# Patient Record
Sex: MALE | URBAN - METROPOLITAN AREA
[De-identification: names, ages, dates, MRNs, and addresses within clinical notes are randomized per-mention and may not be internally consistent; named-entity substitution may affect disease eponyms.]

---

## 2024-08-26 ENCOUNTER — TELEPHONE (OUTPATIENT)
Age: 39
End: 2024-08-26

## 2024-08-26 DIAGNOSIS — M25.532 PAIN IN LEFT WRIST: Primary | ICD-10-CM

## 2024-08-26 NOTE — TELEPHONE ENCOUNTER
Hello,    Please advise if a forced appointment can be accommodated for the patient:    Call back #: [phone]    Insurance: [employer]    Reason for appointment: left radius fx-scheduled for 8/29 now but please schedule for 8/27    Requested doctor and/or location: [facility]      Thank you.

## 2024-08-29 ENCOUNTER — APPOINTMENT (OUTPATIENT)
Dept: RADIOLOGY | Facility: CLINIC | Age: 39
End: 2024-08-29

## 2024-08-29 VITALS
SYSTOLIC BLOOD PRESSURE: 125 MMHG | OXYGEN SATURATION: 98 % | WEIGHT: 233 LBS | DIASTOLIC BLOOD PRESSURE: 85 MMHG | HEART RATE: 65 BPM | HEIGHT: 72 IN | BODY MASS INDEX: 31.56 KG/M2

## 2024-08-29 DIAGNOSIS — M25.532 PAIN IN LEFT WRIST: ICD-10-CM

## 2024-08-29 DIAGNOSIS — S52.515A CLOSED NONDISPLACED FRACTURE OF STYLOID PROCESS OF LEFT RADIUS, INITIAL ENCOUNTER: Primary | ICD-10-CM

## 2024-08-29 PROCEDURE — 99203 OFFICE O/P NEW LOW 30 MIN: CPT | Performed by: ORTHOPAEDIC SURGERY

## 2024-08-29 PROCEDURE — 29075 APPL CST ELBW FNGR SHORT ARM: CPT

## 2024-08-29 PROCEDURE — 73110 X-RAY EXAM OF WRIST: CPT

## 2024-08-29 NOTE — PROGRESS NOTES
Orthopaedics Office Visit - New Patient Visit    ASSESSMENT/PLAN:    Assessment:   Nondisplaced intra-articular radial styloid fracture, DOI 8/24/2024    Plan:   XR obtained and reviewed in office today  Discussed diagnosis  Can proceed with non operative treatment via short arm cast  Cast care instructions provided  OTC NSAIDs PRN  Follow-up in 1 week    To Do Next Visit:  XR left wrist with cast on    _____________________________________________________  CHIEF COMPLAINT:  Chief Complaint   Patient presents with   • Left Wrist - Pain         SUBJECTIVE:  Estevan Alvarado is a 39 y.o. RHD male who presents for initial evaluation of the left wrist. He was playing softball when he dove for the ball and felt a crack in the left wrist as his glove got caught on the ground. He was seen in the local ED where X-rays of the left wrist which demonstrated a nondisplaced intra-articular fracture of the left distal radius. Today, he states his pain is well controlled. He feels the swelling has decreased since the time of injury. He denies any distal paresthesias of the LUE. He has taken Advil scarcely for pain control.    PAST MEDICAL HISTORY:  History reviewed. No pertinent past medical history.    PAST SURGICAL HISTORY:  Past Surgical History:   Procedure Laterality Date   • EYE SURGERY Left     As an infant       FAMILY HISTORY:  Family History   Problem Relation Age of Onset   • Hypertension Brother        SOCIAL HISTORY:  Social History     Tobacco Use   • Smoking status: Never   • Smokeless tobacco: Never   Vaping Use   • Vaping status: Never Used   Substance Use Topics   • Alcohol use: Yes     Alcohol/week: 3.0 standard drinks of alcohol     Types: 3 Standard drinks or equivalent per week   • Drug use: Never       MEDICATIONS:  No current outpatient medications on file.    ALLERGIES:  No Known Allergies    REVIEW OF SYSTEMS:  MSK: see physical exam  Neuro: WNL  Pertinent items are otherwise noted in HPI.  A comprehensive  "review of systems was otherwise negative.    LABS:  HgA1c: No results found for: \"HGBA1C\"  BMP: No results found for: \"GLUCOSE\", \"CALCIUM\", \"NA\", \"K\", \"CO2\", \"CL\", \"BUN\", \"CREATININE\"  CBC: No components found for: \"CBC\"    _____________________________________________________  PHYSICAL EXAMINATION:  Vital signs: /85   Pulse 65   Ht 6' (1.829 m)   Wt 106 kg (233 lb)   SpO2 98%   BMI 31.60 kg/m²   General: No acute distress, awake and alert  Psychiatric: Mood and affect appear appropriate  HEENT: Trachea Midline, No torticollis, no apparent facial trauma  Cardiovascular: No audible murmurs; Extremities appear perfused  Pulmonary: No audible wheezing or stridor  Skin: No open lesions; see further details (if any) below    MUSCULOSKELETAL EXAMINATION:  Extremities:  Left Wrist  Mild radial sided swelling  Mild ecchymosis of left forearm  ROM and strength deferred due to known fracture  SILT  NV distally intact  _____________________________________________________  STUDIES REVIEWED:  I personally reviewed the images obtained in office today and my independent interpretation is as follows:  XR left wrist: nondisplaced intra-articular radial styloid fracture    PROCEDURES PERFORMED:  Cast application    Date/Time: 8/29/2024 2:15 PM    Performed by: Naldo Fried MA  Authorized by: Justyn Barroso MD  Universal Protocol:  Consent: Verbal consent obtained.  Risks and benefits: risks, benefits and alternatives were discussed  Consent given by: patient  Time out: Immediately prior to procedure a \"time out\" was called to verify the correct patient, procedure, equipment, support staff and site/side marked as required.  Patient understanding: patient states understanding of the procedure being performed  Site marked: the operative site was marked  Patient identity confirmed: verbally with patient    Pre-procedure details:     Sensation:  Normal  Procedure details:     Laterality:  Left    Location:  Wrist    " Wrist:  L wristCast type:  Short arm        Supplies:  Cotton padding, 2 layer wrap and fiberglass  Post-procedure details:     Pain:  Unchanged    Sensation:  Normal    Patient tolerance of procedure:  Tolerated well, no immediate complications      Scribe Attestation    I,:  Bridget Bangura am acting as a scribe while in the presence of the attending physician.:       I,:  Justyn Barroso MD personally performed the services described in this documentation    as scribed in my presence.:

## 2024-08-30 DIAGNOSIS — S52.515A CLOSED NONDISPLACED FRACTURE OF STYLOID PROCESS OF LEFT RADIUS, INITIAL ENCOUNTER: Primary | ICD-10-CM

## 2024-09-05 ENCOUNTER — APPOINTMENT (OUTPATIENT)
Dept: RADIOLOGY | Facility: CLINIC | Age: 39
End: 2024-09-05
Payer: COMMERCIAL

## 2024-09-05 VITALS
WEIGHT: 233 LBS | HEART RATE: 78 BPM | SYSTOLIC BLOOD PRESSURE: 123 MMHG | HEIGHT: 72 IN | DIASTOLIC BLOOD PRESSURE: 74 MMHG | BODY MASS INDEX: 31.56 KG/M2

## 2024-09-05 DIAGNOSIS — S52.515A CLOSED NONDISPLACED FRACTURE OF STYLOID PROCESS OF LEFT RADIUS, INITIAL ENCOUNTER: Primary | ICD-10-CM

## 2024-09-05 DIAGNOSIS — S52.515A CLOSED NONDISPLACED FRACTURE OF STYLOID PROCESS OF LEFT RADIUS, INITIAL ENCOUNTER: ICD-10-CM

## 2024-09-05 PROCEDURE — 99213 OFFICE O/P EST LOW 20 MIN: CPT | Performed by: ORTHOPAEDIC SURGERY

## 2024-09-05 PROCEDURE — 73110 X-RAY EXAM OF WRIST: CPT

## 2024-09-05 NOTE — PATIENT INSTRUCTIONS
Non-weight bearing left upper extremity  Please keep cast dry at all times. Contact office if cast becomes wet or damaged  Over the counter analgesics as needed / directed   Ice / heat as directed   Follow up 3 weeks with repeat XR,  cast off

## 2024-09-05 NOTE — PROGRESS NOTES
Orthopaedics Office Visit - Follow up Patient Visit    ASSESSMENT/PLAN:    Assessment:   Nondisplaced intra-articular radial styloid fracture, DOI 8/24/2024   Treated non-operatively in short arm cast.    Maintained alignment in cast       Plan:   Non-weight bearing left upper extremity  Please keep cast dry at all times. Contact office if cast becomes wet or damaged  Over the counter analgesics as needed / directed   Ice / heat as directed   Follow up 3 weeks with repeat XR,  cast off       To Do Next Visit:  Cast off,  XR left wrist     _____________________________________________________  CHIEF COMPLAINT:  Chief Complaint   Patient presents with    Left Wrist - Follow-up         SUBJECTIVE:  Estevan Alvarado is a 39 y.o. male who presents Nondisplaced intra-articular radial styloid fracture, DOI 8/24/2024.  Patient states that his arm is doing well overall.  Patient states he has minimal pain in the arm currently.  Patient has been maintaining his short arm cast as previously directed no complaints.  Patient denies any numbness or tingling the arm.  Patient offers no other complaints at this time.      PAST MEDICAL HISTORY:  History reviewed. No pertinent past medical history.    PAST SURGICAL HISTORY:  Past Surgical History:   Procedure Laterality Date    EYE SURGERY Left     As an infant       FAMILY HISTORY:  Family History   Problem Relation Age of Onset    Hypertension Brother        SOCIAL HISTORY:  Social History     Tobacco Use    Smoking status: Never    Smokeless tobacco: Never   Vaping Use    Vaping status: Never Used   Substance Use Topics    Alcohol use: Yes     Alcohol/week: 3.0 standard drinks of alcohol     Types: 3 Standard drinks or equivalent per week    Drug use: Never       MEDICATIONS:  No current outpatient medications on file.    ALLERGIES:  No Known Allergies    REVIEW OF SYSTEMS:  MSK: left arm pain   Neuro: WNL   Pertinent items are otherwise noted in HPI.  A comprehensive review of  "systems was otherwise negative.    LABS:  HgA1c: No results found for: \"HGBA1C\"  BMP: No results found for: \"GLUCOSE\", \"CALCIUM\", \"NA\", \"K\", \"CO2\", \"CL\", \"BUN\", \"CREATININE\"  CBC: No components found for: \"CBC\"    _____________________________________________________  PHYSICAL EXAMINATION:  Vital signs: /74   Pulse 78   Ht 6' (1.829 m)   Wt 106 kg (233 lb)   BMI 31.60 kg/m²   General: No acute distress, awake and alert  Psychiatric: Mood and affect appear appropriate  HEENT: Trachea Midline, No torticollis, no apparent facial trauma  Cardiovascular: No audible murmurs; Extremities appear perfused  Pulmonary: No audible wheezing or stridor  Skin: No open lesions; see further details (if any) below      MUSCULOSKELETAL EXAMINATION:  Left wrist examination:  Patient sitting comfortably in the office in no apparent distress   Short arm cast present with no acute visible abnormalities   NV intact    _____________________________________________________  STUDIES REVIEWED:  I personally reviewed the images obtained in office today and my independent interpretation is as follows:  Left wrist XR 3 views :  Nondisplaced radial styloid fracture with maintained alignment comparison to previous examination        PROCEDURES PERFORMED:  Procedures            Howard Richter PA-C - assisting  Justyn Barroso MD                              Portions of the record may have been created with voice recognition software.  Occasional wrong word or \"sound a like\" substitutions may have occurred due to the inherent limitations of voice recognition software.  Read the chart carefully and recognize, using context, where substitutions have occurred.    "

## 2024-09-22 DIAGNOSIS — S52.515A CLOSED NONDISPLACED FRACTURE OF STYLOID PROCESS OF LEFT RADIUS, INITIAL ENCOUNTER: Primary | ICD-10-CM

## 2024-09-26 ENCOUNTER — APPOINTMENT (OUTPATIENT)
Dept: RADIOLOGY | Facility: CLINIC | Age: 39
End: 2024-09-26
Payer: COMMERCIAL

## 2024-09-26 VITALS
HEART RATE: 65 BPM | DIASTOLIC BLOOD PRESSURE: 78 MMHG | SYSTOLIC BLOOD PRESSURE: 124 MMHG | WEIGHT: 233 LBS | BODY MASS INDEX: 31.56 KG/M2 | HEIGHT: 72 IN

## 2024-09-26 DIAGNOSIS — S52.515A CLOSED NONDISPLACED FRACTURE OF STYLOID PROCESS OF LEFT RADIUS, INITIAL ENCOUNTER: ICD-10-CM

## 2024-09-26 DIAGNOSIS — S52.515A CLOSED NONDISPLACED FRACTURE OF STYLOID PROCESS OF LEFT RADIUS, INITIAL ENCOUNTER: Primary | ICD-10-CM

## 2024-09-26 PROCEDURE — 73110 X-RAY EXAM OF WRIST: CPT

## 2024-09-26 PROCEDURE — 99213 OFFICE O/P EST LOW 20 MIN: CPT | Performed by: ORTHOPAEDIC SURGERY

## 2024-09-26 NOTE — PROGRESS NOTES
"Orthopaedics Office Visit - Follow up Patient Visit    ASSESSMENT/PLAN:    Assessment:   Nondisplaced intra-articular radial styloid fracture,  DOI 8/24/2024   Treated non-operatively in short arm cast.    Maintained alignment in cast - healed clinically, near completely healed radiographically      Plan:   Non-weight bearing left upper extremity in cock up wrist brace for 1-2 additional weeks,  transition to weight bearing as tolerated   Maintain cock up wrist brace as directed  May remove for shower and sleep / physical therapy   Over the counter analgesics as needed / directed   Ice / heat as directed   Follow up 6 weeks       To Do Next Visit:  XR left wrist     _____________________________________________________  CHIEF COMPLAINT:  Chief Complaint   Patient presents with    Left Wrist - Follow-up         SUBJECTIVE:  Estevan Alvarado is a 39 y.o. male who presents to the office for follow-up evaluation of his left wrist.  Patient states that he has been maintaining his short arm cast as previously directed no complaints.  Patient offers no other complaints at this time.      PAST MEDICAL HISTORY:  History reviewed. No pertinent past medical history.    PAST SURGICAL HISTORY:  Past Surgical History:   Procedure Laterality Date    EYE SURGERY Left     As an infant       FAMILY HISTORY:  Family History   Problem Relation Age of Onset    Hypertension Brother        SOCIAL HISTORY:  Social History     Tobacco Use    Smoking status: Never    Smokeless tobacco: Never   Vaping Use    Vaping status: Never Used   Substance Use Topics    Alcohol use: Yes     Alcohol/week: 3.0 standard drinks of alcohol     Types: 3 Standard drinks or equivalent per week    Drug use: Never       MEDICATIONS:  No current outpatient medications on file.    ALLERGIES:  No Known Allergies    LABS:  HgA1c: No results found for: \"HGBA1C\"  BMP: No results found for: \"GLUCOSE\", \"CALCIUM\", \"NA\", \"K\", \"CO2\", \"CL\", \"BUN\", \"CREATININE\"  CBC: No " "components found for: \"CBC\"    _____________________________________________________  PHYSICAL EXAMINATION:  Vital signs: Ht 6' (1.829 m)   Wt 106 kg (233 lb)   BMI 31.60 kg/m²   General: No acute distress, awake and alert  Psychiatric: Mood and affect appear appropriate  HEENT: Trachea Midline, No torticollis, no apparent facial trauma  Cardiovascular: No audible murmurs; Extremities appear perfused  Pulmonary: No audible wheezing or stridor  Skin: No open lesions; see further details (if any) below      MUSCULOSKELETAL EXAMINATION:  Left wrist examination :  Patient sitting comfortably in the office in no apparent distress   No bony or soft tissue tenderness to palpation noted at this time  Patient extends and flexes wrist to 45 degrees limited by pain  NV intact    _____________________________________________________  STUDIES REVIEWED:  I personally reviewed the images obtained in office today and my independent interpretation is as follows:  Left wrist XR 3 views :  Healing radial styloid fracture with maintained alignment in comparison to previous radiographs.        PROCEDURES PERFORMED:  No procedures were performed at this time.                     Howard Richter PA-C - assisting  Justyn Barroso MD                              Portions of the record may have been created with voice recognition software.  Occasional wrong word or \"sound a like\" substitutions may have occurred due to the inherent limitations of voice recognition software.  Read the chart carefully and recognize, using context, where substitutions have occurred.    "

## 2024-09-26 NOTE — PATIENT INSTRUCTIONS
Non-weight bearing left upper extremity in cock up wrist brace for 1-2 additional weeks,  transition to weight bearing as tolerated   Maintain cock up wrist brace as directed  May remove for shower and sleep / physical therapy   Over the counter analgesics as needed / directed   Ice / heat as directed   Follow up 6 weeks

## 2024-10-09 ENCOUNTER — EVALUATION (OUTPATIENT)
Dept: OCCUPATIONAL THERAPY | Facility: CLINIC | Age: 39
End: 2024-10-09
Payer: COMMERCIAL

## 2024-10-09 DIAGNOSIS — S52.515A CLOSED NONDISPLACED FRACTURE OF STYLOID PROCESS OF LEFT RADIUS, INITIAL ENCOUNTER: ICD-10-CM

## 2024-10-09 PROCEDURE — 97165 OT EVAL LOW COMPLEX 30 MIN: CPT

## 2024-10-09 PROCEDURE — 97110 THERAPEUTIC EXERCISES: CPT

## 2024-10-09 NOTE — PROGRESS NOTES
OT Evaluation     Today's date: 10/9/2024  Patient name: Estevan Alvarado  : 1985  MRN: [id number]  Referring provider: Howard Richter PA*  Dx:   Encounter Diagnosis     ICD-10-CM    1. Closed nondisplaced fracture of styloid process of left radius, initial encounter  S52.515A Ambulatory Referral to PT/OT Hand Therapy                     Assessment  Impairments: abnormal or restricted ROM, activity intolerance, impaired physical strength, lacks appropriate home exercise program, pain with function and weight-bearing intolerance  Other impairment: edema  Functional limitations: No heavy lifting or resistive grasping at this time  Symptom irritability: low    Assessment details: Estevan Alvarado is a 39 y.o., Right HD male referred to hand therapy for a left radial styloid fracture.  Onset of injury 25 due to fall while playing softball.  Patient presents 10/09/24 with impaired ROM, strength of the left forearm, wrist, hand.  Deficits also noted in pain, edema and functional use of the left UE. Patient is wearing a wrist brace prn for activities and driving only. Patient is a good candidate for OT services to abolish pain and edema and restore ROM, strength for a return to independence in daily tasks.    Understanding of Dx/Px/POC: excellent     Prognosis: good    Goals  STGs/LTGs (4-6 weeks)  Patient will demonstrate independence in a HEP to maintain ROM, strength, and function at discharge  Patient will report an average pain level of 1-2/10 to be independent in daily tasks  Patient will demonstrate ROM of the left thumb to WNL  be independent in self care tasks  Patient will demonstrate AROM of the wrist and forearm WFL to be independent in self care tasks  Patient will demonstrate 5/5 muscle strength in the wrist and forearm to be MI for meal prep  Patient will demonstrate left hand strength within 30% of the right hand to be MI for IADL tasks  Patient will demonstrate resolution of  edema      Plan  Patient would benefit from: skilled occupational therapy and custom splinting  Planned modality interventions: ultrasound, thermotherapy: hydrocollator packs and cryotherapy    Planned therapy interventions: activity modification, compression, fine motor coordination training, graded activity, graded exercise, home exercise program, therapeutic exercise, therapeutic activities, stretching, strengthening, patient education, orthotic fitting/training, neuromuscular re-education, manual therapy, IASTM, joint mobilization, kinesiology taping and massage    Frequency: Every other week  Duration in weeks: 6  Plan of Care beginning date: 10/9/2024  Plan of Care expiration date: 2024  Treatment plan discussed with: patient        Subjective Evaluation    History of Present Illness  Date of onset: 2024  Mechanism of injury: trauma  Mechanism of injury: Patient reports he was playing softball and dove to catch ball. When he hit the ground, his wrist bent backwards. Went to ED in [place] and put in a splint. Went to orthopedics at [facility]. Dx with radial styloid fracture left wrist. Had a closed reduction in cast for 4 weeks and has been in a wrist brace for the past two weeks. He now presents for OT evaluation and treatment.  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 Negative for deformity.     Tenderness     Left Wrist/Hand   No tenderness in the radial styloid process.     Neurological Testing     Sensation     Wrist/Hand   Left   Intact: light touch    Active Range of Motion     Left Elbow   Flexion: WFL  Extension: WFL  Forearm supination: WFL  Forearm pronation: 70 degrees     Right Elbow   Forearm pronation: 80 degrees     Left Wrist   Wrist flexion: 80 degrees   Wrist extension: 70 degrees   Radial deviation: 18 degrees   Ulnar deviation: 40 degrees     Right Wrist   Wrist flexion: 85 degrees   Wrist extension: 70 degrees   Radial deviation: 20 degrees   Ulnar deviation: 45 degrees     Left Thumb   Palmar Abduction     CMC: 40 degrees  Radial abduction    CMC: 45 degrees  Kapandji score: 10 degrees      Right Thumb   Palmar Abduction    CMC: 50  Radial Abduction    CMC: 55  Kapandji score: 10 degrees    Additional Active Range of Motion Details  10/8/24: AROM D 2-5 WNL    Strength/Myotome Testing     Left Wrist/Hand   Wrist extension: 4+  Wrist flexion: 4+  Radial deviation: 4+  Ulnar deviation: 4+     (2nd hand position)     Trial 1: 69    Thumb Strength  Key/Lateral Pinch     Trial 1: 18  Palmar/Three-Point Pinch     Trial 1: 21    Right Wrist/Hand      (2nd hand position)     Trial 1: 102    Thumb Strength   Key/Lateral Pinch     Trial 1: 20  Palmar/Three-Point Pinch     Trial 1: 14    Swelling     Left Wrist/Hand   Circumference MCP: 22 cm  Circumference wrist: 19 cm    Right Wrist/Hand   Circumference MCP: 22 cm  Circumference wrist: 18.5 cm             Precautions: DOI 8/24/24. Closed reduction     POC expires Unit limit Auth  expiration date PT/OT + Visit Limit?   12/30/24 [facility] Pending 45 pcy                 Visit/Unit Tracking  AUTH Status:  Date 10/9              Pending Used 1 IE               Remaining                     Date  10/9/24       Manuals      "   MEM NV                               Neuro Re-Ed                                                                 Ther Ex        A/AAROM thumb abd 5\" x 5 palmar and radial       A/AAROM wrist ext, flex, RD, UD AA 5\" x 5 in flex, ext       A/AAROM FA AA 5\" x 5        strength NV       Pinch strength NV       Wrist PREs NV       FA PREs NV               Ther Activity        Translation        Wrist maze        Cones p/s                Modalities        MHP 5' pre TE                       "

## 2024-10-23 ENCOUNTER — APPOINTMENT (OUTPATIENT)
Dept: OCCUPATIONAL THERAPY | Facility: CLINIC | Age: 39
End: 2024-10-23
Payer: COMMERCIAL

## 2024-10-28 ENCOUNTER — APPOINTMENT (OUTPATIENT)
Dept: OCCUPATIONAL THERAPY | Facility: CLINIC | Age: 39
End: 2024-10-28
Payer: COMMERCIAL

## 2024-11-01 DIAGNOSIS — S52.515A CLOSED NONDISPLACED FRACTURE OF STYLOID PROCESS OF LEFT RADIUS, INITIAL ENCOUNTER: Primary | ICD-10-CM
